# Patient Record
Sex: MALE | Race: WHITE
[De-identification: names, ages, dates, MRNs, and addresses within clinical notes are randomized per-mention and may not be internally consistent; named-entity substitution may affect disease eponyms.]

---

## 2022-09-28 ENCOUNTER — HOSPITAL ENCOUNTER (EMERGENCY)
Dept: HOSPITAL 97 - ER | Age: 22
Discharge: HOME | End: 2022-09-28
Payer: COMMERCIAL

## 2022-09-28 DIAGNOSIS — G40.909: ICD-10-CM

## 2022-09-28 DIAGNOSIS — S43.084A: Primary | ICD-10-CM

## 2022-09-28 PROCEDURE — 99284 EMERGENCY DEPT VISIT MOD MDM: CPT

## 2022-09-28 PROCEDURE — 0RSJXZZ REPOSITION RIGHT SHOULDER JOINT, EXTERNAL APPROACH: ICD-10-PCS
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
## 2022-09-28 NOTE — ER
Nurse's Notes                                                                                     

 [facility]                                                                 

Name: Junior Wang                                                                               

Age: 22 yrs                                                                                       

Sex: Male                                                                                         

: 2000                                                                                   

MRN: [id number]                                                                                   

Arrival Date: 2022                                                                          

Time: 13:38                                                                                       

Account#: [id number]                                                                            

Bed 11                                                                                            

Private MD:                                                                                       

Diagnosis: Other dislocation of right shoulder joint                                              

                                                                                                  

Presentation:                                                                                     

                                                                                             

13:42 Chief complaint: Patient states: This morning I was walking the down metal stairs this  iw  

      morning and work and I slipped and fell and I tried to brace myself with my arm and         

      when I did I felt a pop and my shoulder dislocated. Coronavirus screen: At this time,       

      the client does not indicate any symptoms associated with coronavirus-19. Ebola Screen:     

      No symptoms or risks identified at this time. Initial Sepsis Screen: Does the patient       

      meet any 2 criteria? No. Patient's initial sepsis screen is negative. Does the patient      

      have a suspected source of infection? No. Patient's initial sepsis screen is negative.      

      Risk Assessment: Do you want to hurt yourself or someone else? Patient reports no           

      desire to harm self or others. Onset of symptoms was 2022.                    

13:42 Method Of Arrival: Ambulatory                                                           iw  

13:42 Acuity: ARMANDO 3                                                                           iw  

                                                                                                  

Triage Assessment:                                                                                

13:44 General: Appears in no apparent distress. uncomfortable, Behavior is calm, cooperative. iw  

      Pain: Complains of pain in right arm. EENT: No deficits noted. No signs and/or symptoms     

      were reported regarding the EENT system. Neuro: No deficits noted. Cardiovascular: No       

      deficits noted. Respiratory: No deficits noted. GI: No deficits noted. No signs and/or      

      symptoms were reported involving the gastrointestinal system. : No deficits noted. No     

      signs and/or symptoms were reported regarding the genitourinary system. Derm: No            

      deficits noted. No signs and/or symptoms reported regarding the dermatologic system.        

      Musculoskeletal: Reports pain in right arm.                                                 

                                                                                                  

Historical:                                                                                       

- Allergies:                                                                                      

13:44 No Known Allergies;                                                                     iw  

- Home Meds:                                                                                      

13:44 Keppra 500 mg Oral tab 3 tabs daily [Active]; Ativan 1 mg Oral tab 1 tab once daily     iw  

      [Active];                                                                                   

- PMHx:                                                                                           

13:44 epilepsy;                                                                               iw  

- PSHx:                                                                                           

13:44 shoulder;                                                                               iw  

                                                                                                  

- Immunization history:: Adult Immunizations up to date, Client reports receiving the             

  2nd dose of the Covid vaccine, Client reports receiving the 1st dose of the Covid               

  vaccine.                                                                                        

- Social history:: Smoking status: Patient denies any tobacco usage or history of.                

                                                                                                  

                                                                                                  

Screenin:53 Abuse screen: Denies threats or abuse. Nutritional screening: No deficits noted.        bm7 

      Tuberculosis screening: No symptoms or risk factors identified. Fall Risk None              

      identified.                                                                                 

                                                                                                  

Assessment:                                                                                       

14:53 Reassessment: Patient and/or family updated on plan of care and expected duration. Pain bm7 

      level reassessed. Patient is alert, oriented x 3, equal unlabored respirations, skin        

      warm/dry/pink. Patient states feeling better. Patient states symptoms have improved.        

                                                                                                  

Vital Signs:                                                                                      

13:42  / 78; Pulse 60; Resp 16; Temp 97.9(O); Pulse Ox 100% on R/A; Weight 72.12 kg     iw  

      (R); Height 6 ft. 2 in. (187.96 cm); Pain 10/10;                                            

13:42 Body Mass Index 20.41 (72.12 kg, 187.96 cm)                                             iw  

                                                                                                  

ED Course:                                                                                        

13:38 Patient arrived in ED.                                                                  mr  

13:42 Rikki Morales PA is PHCP.                                                              [facility] 

13:42 Matt Moreno MD is Attending Physician.                                                [facility] 

13:44 Triage completed.                                                                       iw  

13:44 Arm band placed on left wrist.                                                          iw  

14:06 Luz Jean, RN is Primary Nurse.                                                   hb  

14:47 Richard Mancuso MD is Referral Physician.                                                [facility] 

14:53 Patient has correct armband on for positive identification. Call light in reach. Adult  bm7 

      w/ patient. Client placed on continuous cardiac and pulse oximetry monitoring. NIBP         

      monitoring applied.                                                                         

14:53 Scapular manipulation. Patient did not have IV access during this emergency room visit. bm7 

      Patient maintains SpO2 saturation greater than 95% on room air.                             

                                                                                                  

Administered Medications:                                                                         

No medications were administered                                                                  

                                                                                                  

                                                                                                  

Medication:                                                                                       

14:53 VIS not applicable for this client.                                                     bm7 

                                                                                                  

Outcome:                                                                                          

14:47 Discharge ordered by MD.                                                                [facility] 

14:53 Discharged to home ambulatory, with family.                                             bm7 

14:53 Condition: improved                                                                         

14:53 Discharge instructions given to patient, family, Instructed on discharge instructions,      

      follow up and referral plans. Demonstrated understanding of instructions, follow-up         

      care.                                                                                       

14:55 Patient left the ED.                                                                    bm7 

                                                                                                  

Signatures:                                                                                       

Rikki Morales PA PA jmm Rivera, Mary mr Williams, Irene, RN                     RN   iw                                                   

Luz Jean, RN                     RN   hb                                                   

Stephy, Zhanna, RN                  RN   bm7                                                  

                                                                                                  

**************************************************************************************************

## 2022-09-30 VITALS — OXYGEN SATURATION: 100 % | TEMPERATURE: 97.9 F | SYSTOLIC BLOOD PRESSURE: 112 MMHG | DIASTOLIC BLOOD PRESSURE: 78 MMHG

## 2023-03-02 ENCOUNTER — HOSPITAL ENCOUNTER (EMERGENCY)
Dept: HOSPITAL 97 - ER | Age: 23
Discharge: HOME | End: 2023-03-02
Payer: COMMERCIAL

## 2023-03-02 DIAGNOSIS — G40.901: Primary | ICD-10-CM

## 2023-03-02 LAB
ALBUMIN SERPL BCP-MCNC: 3.9 G/DL (ref 3.4–5)
ALP SERPL-CCNC: 58 U/L (ref 45–117)
ALT SERPL W P-5'-P-CCNC: 20 U/L (ref 16–61)
AST SERPL W P-5'-P-CCNC: 10 U/L (ref 15–37)
BUN BLD-MCNC: 14 MG/DL (ref 7–18)
GLUCOSE SERPLBLD-MCNC: 96 MG/DL (ref 74–106)
HCT VFR BLD CALC: 45.5 % (ref 39.6–49)
INR BLD: 1.08
LYMPHOCYTES # SPEC AUTO: 1.9 K/UL (ref 0.7–4.9)
MCV RBC: 88.5 FL (ref 80–100)
METHAMPHET UR QL SCN: NEGATIVE
PMV BLD: 7.2 FL (ref 7.6–11.3)
POTASSIUM SERPL-SCNC: 3.9 MMOL/L (ref 3.5–5.1)
RBC # BLD: 5.14 M/UL (ref 4.33–5.43)
THC SERPL-MCNC: NEGATIVE NG/ML

## 2023-03-02 PROCEDURE — 85610 PROTHROMBIN TIME: CPT

## 2023-03-02 PROCEDURE — 80307 DRUG TEST PRSMV CHEM ANLYZR: CPT

## 2023-03-02 PROCEDURE — 36415 COLL VENOUS BLD VENIPUNCTURE: CPT

## 2023-03-02 PROCEDURE — 93005 ELECTROCARDIOGRAM TRACING: CPT

## 2023-03-02 PROCEDURE — 96374 THER/PROPH/DIAG INJ IV PUSH: CPT

## 2023-03-02 PROCEDURE — 80048 BASIC METABOLIC PNL TOTAL CA: CPT

## 2023-03-02 PROCEDURE — 70450 CT HEAD/BRAIN W/O DYE: CPT

## 2023-03-02 PROCEDURE — 85025 COMPLETE CBC W/AUTO DIFF WBC: CPT

## 2023-03-02 PROCEDURE — 99284 EMERGENCY DEPT VISIT MOD MDM: CPT

## 2023-03-02 PROCEDURE — 85730 THROMBOPLASTIN TIME PARTIAL: CPT

## 2023-03-02 PROCEDURE — 81003 URINALYSIS AUTO W/O SCOPE: CPT

## 2023-03-02 PROCEDURE — 80076 HEPATIC FUNCTION PANEL: CPT

## 2023-03-02 NOTE — EDPHYS
Physician Documentation                                                                           

 [facility]                                                                 

Name: Junior Wang                                                                               

Age: 22 yrs                                                                                       

Sex: Male                                                                                         

: 2000                                                                                   

MRN: [id number]                                                                                   

Arrival Date: 2023                                                                          

Time: 10:41                                                                                       

Account#: [id number]                                                                            

Bed 13                                                                                            

Private MD:                                                                                       

ED Physician Matt Moreno                                                                         

HPI:                                                                                              

                                                                                             

11:17 This 22 yrs old Male presents to ER via EMS with complaints of Seizure.                 sb4 

11:17 The patient presents with a history of multiple seizures, a total of 2, the episode(s)  sb4 

      was witnessed, by a significant other, girlfriend. Character of seizure(s): Motor           

      activity: generalized, shaking all over, Incontinence: none, Apnea: the patient did not     

      experience apnea, Circulation: the patient did not experience evidence of pulse             

      disturbance. Seizure onset: just prior to arrival. Context: occurred at home, occurred      

      while the patient was asleep. Seizure Hx: Last seizure: The patient's last seizure was      

      approximately 3 day(s) ago, Seizure medications: Ativan, Keppra. Associated injury: The     

      patient did not suffer any apparent associated injury. EMS care: none. Current              

      symptoms: post-ictal. The patient has been recently seen by a physician: a neurologist.     

      EMS called by patient's girlfriend as she states she saw him seizing while asleep on        

      the couch, and a generalized shaking manner. He also reports that he had 1 last night.      

      Patient states he had 1 on Monday. He states that he has a history of absence seizures      

      and takes Keppra 500 mg 3 times a day, which she has been compliant with. Patient also      

      takes Ativan at night as needed, which he states that he has been out of for a week         

      now. Patient is sleepy but arousable and answering questions appropriately during my        

      assessment..                                                                                

                                                                                                  

Historical:                                                                                       

- Allergies:                                                                                      

10:46 No Known Allergies;                                                                     ld1 

- Home Meds:                                                                                      

10:46 Ativan 1 mg Oral tab 1 tab once daily [Active]; Keppra 500 mg Oral tab 3 tabs daily     ld1 

      [Active];                                                                                   

- PMHx:                                                                                           

10:46 epilepsy;                                                                               ld1 

- PSHx:                                                                                           

10:46 Shoulder;                                                                               ld1 

                                                                                                  

- Immunization history:: Adult Immunizations up to date, Client reports receiving the             

  2nd dose of the Covid vaccine.                                                                  

- Social history:: Smoking status: Patient reports the use of cigarette tobacco                   

  products, smokes one pack cigarettes per day. Patient/guardian denies using alcohol.            

                                                                                                  

                                                                                                  

ROS:                                                                                              

11:17 Constitutional: Negative for fever, chills, and weight loss, Eyes: Negative for injury, sb4 

      pain, redness, and discharge, ENT: Negative for injury, pain, and discharge,                

      Cardiovascular: Negative for chest pain, palpitations, and edema, Respiratory: Negative     

      for shortness of breath, cough, wheezing, and pleuritic chest pain, Abdomen/GI:             

      Negative for abdominal pain, nausea, vomiting, diarrhea, and constipation,                  

      MS/Extremity: Negative for injury and deformity, Skin: Negative for injury, rash, and       

      discoloration, Psych: Negative for depression, anxiety, suicide ideation, homicidal         

      ideation, and hallucinations.                                                               

11:17 Neuro: Positive for seizure activity.                                                       

                                                                                                  

Exam:                                                                                             

11:17 Head/Face:  Normocephalic, atraumatic. Eyes:  Extra-ocular motions intact.  Periorbital sb4 

      areas with no swelling, redness, or edema. ENT:  Mucous membranes moist.                    

      Cardiovascular:  Regular rate and rhythm with a normal S1 and S2. Respiratory:  Lungs       

      have equal breath sounds bilaterally, clear to auscultation and percussion.  No rales,      

      rhonchi or wheezes noted.  No increased work of breathing, no retractions or nasal          

      flaring. Abdomen/GI:  Soft, non-tender, no distension. Skin:  Warm, dry with normal         

      turgor.  Normal color with no rashes, no lesions, and no evidence of cellulitis. MS/        

      Extremity:  Pulses equal, no cyanosis.  Neurovascular intact.  Full, normal range of        

      motion. Neuro:  Awake and alert, GCS 15, oriented to person, place, time, and               

      situation.  Cranial nerves II-XII grossly intact.  Motor strength 5/5 in all                

      extremities.  Sensory grossly intact.  Cerebellar exam normal.  Normal gait. Psych:         

      Awake, alert, with orientation to person, place and time.  Behavior, mood, and affect       

      are within normal limits.                                                                   

11:17 Constitutional: The patient appears in no acute distress, post-ictal                        

11:17 ECG was reviewed by the Attending Physician.                                                

                                                                                                  

Vital Signs:                                                                                      

10:44  / 84; Pulse 53; Resp 18; Temp 97.6(O); Pulse Ox 99% on R/A; Weight 72.57 kg;     ld1 

      Height 5 ft. 10 in. (177.80 cm); Pain 6/10;                                                 

12:06  / 76; Pulse 61; Resp 18; Pulse Ox 99% on R/A;                                    ld1 

10:44 Body Mass Index 22.96 (72.57 kg, 177.80 cm)                                             ld1 

                                                                                                  

MDM:                                                                                              

10:42 Patient medically screened.                                                             sb4 

11:17 Differential diagnosis: drug overdose, cardiac arrhythmia, seizure, TIA, pseudoseizure, sb4 

      benzo withdrawal, alcohol intoxication.                                                     

13:16 Data reviewed: vital signs, nurses notes, EMS record, lab test result(s), EKG,          sb4 

      radiologic studies, CT scan, I have discussed the patient's presentation/case with the      

      attending Emergency Department Physician; and as a result, I will discharge patient.        

      Consideration of Admission/Observation Escalation of care including                         

      admission/observation considered. Care significantly affected by the following chronic      

      conditions: epilepsy. ED course: Patient with negative workup. Gave 1L fluid and 1000       

      mg keppra. Patient is back to baseline. Seizure likely secondary to patient being off       

      of ativan. Will dispo home with instructions to follow up with neurology ASAP. Return       

      precautions understood.  reviewed confirming prescription for ativan..                   

                                                                                                  

                                                                                             

10:42 Order name: Acetaminophen; Complete Time: 11:43                                         4 

                                                                                             

10:42 Order name: Basic Metabolic Panel; Complete Time: 11:43                                 sb4 

                                                                                             

10:42 Order name: CBC with Diff; Complete Time: 11:30                                         sb4 

                                                                                             

10:42 Order name: ETOH Level; Complete Time: 11:36                                            sb4 

                                                                                             

10:42 Order name: Hepatic Function; Complete Time: 11:43                                      4 

                                                                                             

10:42 Order name: PT-INR; Complete Time: 11:30                                                sb4 

                                                                                             

10:42 Order name: Ptt, Activated; Complete Time: 11:30                                        sb4 

                                                                                             

10:42 Order name: Salicylate; Complete Time: 11:48                                            sb4 

                                                                                             

10:42 Order name: Urine Drug Screen; Complete Time: 11:34                                     sb4 

                                                                                             

10:42 Order name: EKG; Complete Time: 10:43                                                   sb4 

                                                                                             

10:42 Order name: EKG - Nurse/Tech; Complete Time: 10:56                                      sb4 

                                                                                             

10:42 Order name: IV Saline Lock; Complete Time: 10:48                                        sb4 

                                                                                             

10:42 Order name: Labs collected and sent; Complete Time: 10:56                               sb4 

                                                                                             

10:42 Order name: Suicide Screening ([place]); Complete Time: 10:48                          sb4 

                                                                                             

10:42 Order name: Urine Dipstick-Ancillary (obtain specimen); Complete Time: 11:18            sb4 

                                                                                             

11:12 Order name: Urine Dipstick-Ancillary; Complete Time: 11:13                              EDMS

                                                                                             

11:37 Order name: Head Brain Wo Cont CT; Complete Time: 12:01                                 sb4 

                                                                                                  

EC:17 Rate is 50 beats/min. Rhythm is regular, Sinus bradycardia. Right axis deviation noted. sb4 

      QRS is positive in lead aVF and negative in lead I. PA interval is normal at 138 msec.      

      QRS interval is normal at 100 msec. QT interval is normal at 408 msec.                      

                                                                                                  

Administered Medications:                                                                         

10:56 Drug: NS 0.9% 1000 ml Route: IV; Rate: 1 bolus; Site: right antecubital;                ld1 

12:43 Drug: Keppra (levETIRAcetam) 1000 mg Route: IV; Rate: calculated rate; Site: right      ld1 

      antecubital;                                                                                

                                                                                                  

                                                                                                  

Disposition:                                                                                      

19:22 Co-signature as Attending Physician, Matt Moreno MD I reviewed the patient's care       rn  

      provided by the Advanced Practice Provider and agree with the diagnosis and treatment       

      plan.                                                                                       

                                                                                                  

Disposition Summary:                                                                              

23 13:18                                                                                    

Discharge Ordered                                                                                 

      Location: Home                                                                          sb4 

      Problem: an acute exacerbation                                                          sb4 

      Symptoms: have improved                                                                 sb4 

      Condition: Stable                                                                       sb4 

      Diagnosis                                                                                   

        - Epilepsy, unspecified, not intractable, with status epilepticus                     sb4 

      Followup:                                                                               sb4 

        - With:                                                                                    

        - When: Tomorrow                                                                           

        - Reason: Recheck today's complaints, Continuance of care, Re-evaluation by your           

      physician                                                                                   

      Discharge Instructions:                                                                     

        - Discharge Summary Sheet                                                             sb4 

        - Seizure, Adult                                                                      sb4 

        - Seizure, Adult, Easy-to-Read                                                        sb4 

      Forms:                                                                                      

        - Medication Reconciliation Form                                                      sb4 

        - Thank You Letter                                                                    sb4 

        - Antibiotic Education                                                                sb4 

        - Prescription Opioid Use                                                             sb4 

Signatures:                                                                                       

Dispatcher [employer]                           Matt Solis MD MD rn Dibbern, Lauren, RN                     RN   ld1                                                  

Caprice Falcon PA-C PA-C sb4                                                  

                                                                                                  

**************************************************************************************************

## 2023-03-02 NOTE — XMS REPORT
Continuity of Care Document

                            Created on:2023



Patient:TORRIE RUIZ

Sex:Male

:2000

External Reference #:[id number]





Demographics







                          Address                   [address] [place] [address]

 

                          Home Phone                [phone]

 

                          Work Phone                [phone]

 

                          Mobile Phone              [phone]

 

                          Email Address             [email or web address]

 

                          Preferred Language        English

 

                          Marital Status            Unknown

 

                          Mosque Affiliation     Unknown

 

                          Race                      Unknown

 

                          Ethnic Group              Unknown









Author







                          Organization              [facility]

t

 

                          Address                   [address]



                                                    [place] [address]

 

                          Phone                     [phone]









Care Team Providers







                    Name                Role                Phone

 

                    PCP, PATIENT DOES NOT HAVE A Primary Care Physician Unavaila

ble

 

                    Doctor Unassigned, No Name Attending Clinician Unavailable

 

                    VAN GALLARDO     Attending Clinician Unavailable

 

                    Van Gallardo MD  Attending Clinician [phone]

 

                    VAN GALLARDO     Admitting Clinician Unavailable









Payers







           Payer Name Policy Type Policy Number Effective Date Expiration Date S

ource

 

           [facility]            [id number] 2020 00:00:00            







Problems

This patient has no known problems.



Allergies, Adverse Reactions, Alerts







       Allergy Allergy Status Severity Reaction(s) Onset  Inactive Treating Comm

ents 

Source



       Name   Type                        Date   Date   Clinician        

 

       NO KNOWN Drug   Active                                           [facility]



       ALLERGIE Class                                                   ity of



       S                                                              [place]



                                                                      Medical



                                                                      Branch







Social History







           Social Habit Start Date Stop Date  Quantity   Comments   Source

 

           Exposure to 2022 Not sure              [facility]

 [place]



           SARS-CoV-2 (event) 00:00:00   02:29:00                         Medica

l Branch

 

           Sex Assigned At 2000                       [facility]



           Birth      00:00:00   00:00:00                         Medical Branch









                Smoking Status  Start Date      Stop Date       Source

 

                Tobacco smoking consumption                                 [facility]                                         Branch







Medications







       Ordered Filled Start  Stop   Current Ordering Indication Dosage Frequency

 Signature

                    Comments            Components          Source



     Medication Medication Date Date Medication? Clinician                (SIG) 

          



     Name Name                                                   

 

     levETIRAcet      2022 No             1000mg      1,000 mg,         

  [facility]



     am (KEPPRA)                                IV             ity of



     in NACL      08:45: 09:02                          Piggyback,           [place]

as



     (ISO-OS)      00   :00                           ONCE, 1           Medical



     1,000                                         dose, On           Branch



     mg/100 mL                                         Tue            



     RTU                                          22           



                                                  at 0245,           



                                                  Administer           



                                                  over 15           



                                                  Minutes,           



                                                  100 mL           

 

     levETIRAcet      2022- No        [id number] 1500mg      Take 3        

   [facility]



     am (KEPPRA)       12-30                          tablets by           i

ty of



     500 mg      00:00: 05:59                          mouth in           [place]



     tablet      00   :00                           the            Medical



                                                  morning           Branch



                                                  and 3           



                                                  tablets in           



                                                  the            



                                                  evening.           



                                                  Do all           



                                                  this for           



                                                  30 days.           







Vital Signs







             Vital Name   Observation Time Observation Value Comments     Source

 

             Systolic blood 2022 11:31:00 115 mm[Hg]                Univer

sity of



             pressure                                            [facility]

 

             Diastolic blood 2022 11:31:00 77 mm[Hg]                 [facility]

 

             Heart rate   2022 11:31:00 53 /min                   [facility]

 

             Respiratory rate 2022 11:31:00 15 /min                   [facility]

 

             Oxygen saturation in 2022 11:31:00 98 /min                   

[facility]



             Arterial blood by                                        [place] [facility]i

neisha



             Pulse oximetry                                        Branch

 

             Body temperature 2022 08:29:00 36.11 Lynne                 [facility]

 

             Body height  2022 08:29:00 188 cm                    [facility]

 

             Body weight  2022 08:29:00 67.132 kg                 [facility]

 

             BMI          2022 08:29:00 19.00 kg/m2               [facility]







Procedures







                Procedure       Date / Time     Performing Clinician Source



                                Performed                       

 

                AUTHORIZATION FOR 2022 06:01:00 Doctor Unassigned, No [facility]



                RELEASE OF PHI                  [facility]

 

                XR SHOULDER 2+ VW LEFT 2022 10:50:55 Van Gallardo [facility]

 

                URINALYSIS      2022 09:37:00 Van Gallardo [facility]

 

                URINE DRUG (IMMUNOASSAY) 2022 09:37:00 Van Gallardo [facility]



                SCREEN W/O REFLEX                                 

 

                XR SHOULDER 2+ VW RIGHT 2022 08:54:23 Van Gallardo [facility]

 

                MAGNESIUM       2022 08:34:00 Van Gallardo o

f [facility]

 

                COMP. METABOLIC PANEL 2022 08:34:00 Van Gallardo [facility]



                ([id number])                                         Medical Branch

 

                ETHANOL         2022 08:34:00 Van Gallardo [facility]

 

                CBC WITH DIFF   2022 08:34:00 Van Gallardo [facility]

 

                NOTICE OF PRIVACY 2022 17:38:09 Doctor Unassigned, No Univ

[facility]



                PRACTICES                       Name            Medical Branch

 

                CONSENT/REFUSAL FOR 2022 17:37:51 Doctor Unassigned, No [facility]



                DIAGNOSIS AND TREATMENT                 Name            Medical 

[place]







Encounters







        Start   End     Encounter Admission Attending Care    Care    Encounter 

Source



        Date/Time Date/Time Type    Type    Clinicians Facility Department ID   

   

 

        2022 Orders          Doctor BELTRAN    [id number]

63 Univers



        00:00:00 00:00:00 Only            Unassigned, APOORVA   350.1.13.10       

  ity of



                                        [facility] 4.2.7.2.686         [place]ip

as



                                                        [phone]         Medi

neisha



                                                        009             Branch

 

        2022 [facility]     [id number]

65 Univers



        02:28:00 05:45:00                 VAN huang [facility]

 

        2022 Emergency         [facility]    [id number]

0050 Univers



        02:28:00 05:45:00                 Van DIALLO 350.1.13.10         i

ty of



                                                [place] 4.2.7.2.686         [facility]  [phone]         Medi

neisha



                                                        084             Branch

 

        2022 Emergency X       [facility]     [id number]

44 Univers



        13:17:00 13:18:00                 VAN huang [facility]

 

        2022 [facility]         [facility]    [id number]

7683 Univers



        13:17:00 13:18:00                 Van SANTOXOCHILT 350.1.13.10         i

ty of



                                                [place] 4.2.7.2.686         [facility]  [phone]         Medi

neisha



                                                        [address]             Branch







Results







           Test Description Test Time  Test Comments Results    Result     [facility]

e



                                                       Comments   

 

           ETHANOL    2022            ALCOHOL<10mg/dL1            Universi

ty of



                      09:29:39              2022 3:29            [facility]            



                                            LABORATORY<10            



                                            Roeeehvn21-228            



                                            Toxic>100             



                                            Depression of            



                                            CNS>400               



                                            Fatalities            



                                            Reported              









                    MAGNESIUM           2022 09:03:46 









                      Test Item  Value      Reference Range Interpretation Comme

nts









             MAGNESIUM (test code = 1534391896) 2.1 mg/dL    1.7-2.4            

       

 

             Lab Interpretation (test code = 33234-5) Normal                    

             



[id number] 
[facility]:25





             Test Item    Value        Reference Range Interpretation Comments

 

             NA (test code = 141 mmol/L   135-145                   



             5589276858)                                         

 

             K (test code = 4.2 mmol/L   3.5-5.0                   



             4416180344)                                         

 

             CL (test code = 107 mmol/L                       



             0206600003)                                         

 

             CO2 TOTAL (test code = 23 mmol/L    23-31                     



             7921639131)                                         

 

             AGAP (test code =              2-16                      



             0973681956)                                         

 

             BUN (test code = 11 mg/dL     7-23                      



             5543812384)                                         

 

             GLUCOSE (test code = 143 mg/dL           H            



             9189049584)                                         

 

             CREATININE (test code = 1.06 mg/dL   0.60-1.25                 



             2747091782)                                         

 

             TOTAL BILI (test code = 0.8 mg/dL    0.1-1.1                   



             2802153133)                                         

 

             CALCIUM (test code = 8.8 mg/dL    8.6-10.6                  



             5433219872)                                         

 

             T PROTEIN (test code = 6.6 g/dL     6.3-8.2                   



             6729086941)                                         

 

             ALBUMIN (test code = 4.3 g/dL     3.5-5.0                   



             0506097082)                                         

 

             ALK PHOS (test code = 56 U/L                           



             0431433358)                                         

 

             ALTv (test code = 17 U/L       5-50                      



             1742-6)                                             

 

             AST(SGOT) (test code = 19 U/L       13-40                     



             8600868800)                                         

 

             eGFR (test code =              mL/min/1.73m2              



             2463407709)                                         

 

             MICHAEL (test code = MICHAEL) Association of                           



                          Glomerular Filtration                           



                          Rate (GFR) and Staging                           



                          of Kidney Disease*                           



                          +---------------------                           



                          --+-------------------                           



                          --+-------------------                           



                          ------+| GFR                           



                          (mL/min/1.73 m2) ?|                           



                          With Kidney Damage ?|                           



                          ?Without Kidney                           



                          Damage+---------------                           



                          --------+-------------                           



                          --------+-------------                           



                          ------------+| ?>90 ?                           



                          ? ? ? ? ? ? ? ?|                           



                          ?Stage one ? ? ? ? ?|                           



                          ? Normal ? ? ? ? ? ? ?                           



                          ?+--------------------                           



                          ---+------------------                           



                          ---+------------------                           



                          -------+| ?60-89 ? ? ?                           



                          ? ? ? ? ?| ?Stage two                           



                          ? ? ? ? ?| ? Decreased                           



                          GFR ? ? ? ?                            



                          +---------------------                           



                          --+-------------------                           



                          --+-------------------                           



                          ------+| ?30-59 ? ? ?                           



                          ? ? ? ? ?| ?Stage                           



                          three ? ? ? ?| ? Stage                           



                          three ? ? ? ? ?                           



                          +---------------------                           



                          --+-------------------                           



                          --+-------------------                           



                          ------+| ?15-29 ? ? ?                           



                          ? ? ? ? ?| ?Stage four                           



                          ? ? ? ? | ? Stage four                           



                          ? ? ? ? ?                              



                          ?+--------------------                           



                          ---+------------------                           



                          ---+------------------                           



                          -------+| ?<15 (or                           



                          dialysis) ? ?| ?Stage                           



                          five ? ? ? ? | ? Stage                           



                          five ? ? ? ? ?                           



                          ?+--------------------                           



                          ---+------------------                           



                          ---+------------------                           



                          -------+ *Each stage                           



                          assumes the associated                           



                          GFR level has been in                           



                          effect for at least                           



                          three months. ?Stages                           



                          1 to 5, with or                           



                          without kidney                           



                          disease, indicate                           



                          chronic kidney                           



                          disease. Notes:                           



                          Determination of                           



                          stages one and two                           



                          (with eGFR                             



                          >59mL/min/1.73 m2)                           



                          requires estimation of                           



                          kidney damage for at                           



                          least three months as                           



                          defined by structural                           



                          or functional                           



                          abnormalities of the                           



                          kidney, manifested by                           



                          either:Pathological                           



                          abnormalities or                           



                          Markers of kidney                           



                          damage (including                           



                          abnormalities in the                           



                          composition of the                           



                          blood or urine or                           



                          abnormalities in                           



                          imaging tests).                           

 

             Lab Interpretation Abnormal                               



             (test code = 18128-2)                                        



[facility] WITH [id number] 08:44:42





             Test Item    Value        Reference Range Interpretation Comments

 

             WBC (test code =              See_Comment                [Automated



             4447-2)                                             message] The sy

stem



                                                                 which generated



                                                                 this result



                                                                 transmitted



                                                                 reference range

:



                                                                 4.20 - 10.70



                                                                 10*3/?L. The



                                                                 reference range

 was



                                                                 not used to



                                                                 interpret this



                                                                 result as



                                                                 normal/abnormal

.

 

             RBC (test code =              See_Comment                [Automated



             868-5)                                              message] The sy

stem



                                                                 which generated



                                                                 this result



                                                                 transmitted



                                                                 reference range

:



                                                                 4.26 - 5.52



                                                                 10*6/?L. The



                                                                 reference range

 was



                                                                 not used to



                                                                 interpret this



                                                                 result as



                                                                 normal/abnormal

.

 

             HGB (test code = 14.2 g/dL    12.2-16.4                 



             718-7)                                              

 

             HCT (test code = 40.7 %       38.4-49.3                 



             4544-3)                                             

 

             MCV (test code = 88.5 fL      81.7-95.6                 



             787-2)                                              

 

             MCH (test code = 30.9 pg      26.1-32.7                 



             785-6)                                              

 

             MCHC (test code = 34.9 g/dL    31.2-35.0                 



             786-4)                                              

 

             RDW-SD (test code = 39.8 fL      38.5-51.6                 



             68655-8)                                            

 

             RDW-CV (test code = 12.4 %       12.1-15.4                 



             788-0)                                              

 

             PLT (test code =              See_Comment                [Automated



             777-3)                                              message] The sy

stem



                                                                 which generated



                                                                 this result



                                                                 transmitted



                                                                 reference range

:



                                                                 150 - 328 10*3/

?L.



                                                                 The reference r

ron



                                                                 was not used to



                                                                 interpret this



                                                                 result as



                                                                 normal/abnormal

.

 

             MPV (test code = 9.1 fL       9.8-13.0     L            



             78987-7)                                            

 

             NRBC/100 WBC (test              See_Comment                [Automat

ed



             code = 6884991530)                                        message] 

The system



                                                                 which generated



                                                                 this result



                                                                 transmitted



                                                                 reference range

:



                                                                 0.0 - 10.0 /100



                                                                 WBCs. The refer

ence



                                                                 range was not u

sed



                                                                 to interpret th

is



                                                                 result as



                                                                 normal/abnormal

.

 

             NRBC x10^3 (test code              See_Comment                [Auto

mated



             = [id number])                                        message] The s

ystem



                                                                 which generated



                                                                 this result



                                                                 transmitted



                                                                 reference range

:



                                                                 10*3/?L. The



                                                                 reference range

 was



                                                                 not used to



                                                                 interpret this



                                                                 result as



                                                                 normal/abnormal

.

 

             GRAN MAT (NEUT) % 54.1 %                                 



             (test code = 770-8)                                        

 

             IMM GRAN % (test code 0.50 %                                 



             = [id number])                                        

 

             LYMPH % (test code = 35.0 %                                 



             736-9)                                              

 

             MONO % (test code = 8.9 %                                  



             5905-5)                                             

 

             EOS % (test code = 0.7 %                                  



             713-8)                                              

 

             BASO % (test code = 0.8 %                                  



             706-2)                                              

 

             GRAN MAT x10^3(ANC) 3.23 10*3/uL 1.99-6.95                 



             (test code =                                        



             2950185272)                                         

 

             IMM GRAN x10^3 (test 0.03 10*3/uL 0.00-0.06                 



             code = [id number])                                        

 

             LYMPH x10^3 (test code 2.09 10*3/uL 1.09-3.23                 



             = 731-0)                                            

 

             MONO x10^3 (test code 0.53 10*3/uL 0.36-1.02                 



             = 742-7)                                            

 

             EOS x10^3 (test code = 0.04 10*3/uL 0.06-0.53    L            



             711-2)                                              

 

             BASO x10^3 (test code 0.05 10*3/uL 0.01-0.09                 



             = 704-7)                                            

 

             Lab Interpretation Abnormal                               



             (test code = 35327-6)                                        



[facility]

## 2023-03-02 NOTE — ER
Nurse's Notes                                                                                     

 [facility]                                                                 

Name: Junior Wang                                                                               

Age: 22 yrs                                                                                       

Sex: Male                                                                                         

: 2000                                                                                   

MRN: [id number]                                                                                   

Arrival Date: 2023                                                                          

Time: 10:41                                                                                       

Account#: [id number]                                                                            

Bed 13                                                                                            

Private MD:                                                                                       

Diagnosis: Epilepsy, unspecified, not intractable, with status epilepticus                        

                                                                                                  

Presentation:                                                                                     

                                                                                             

10:44 Chief complaint: Patient states: EMS toned out to patient home for seizure. EMS arrived ld1 

      - pt was postictal. Pt c/o pain to entire body post seizure. Denies hitting head -          

      "body aching.". Coronavirus screen: At this time, the client does not indicate any          

      symptoms associated with coronavirus-19. Ebola Screen: No symptoms or risks identified      

      at this time. Initial Sepsis Screen: Does the patient meet any 2 criteria? No.              

      Patient's initial sepsis screen is negative. Does the patient have a suspected source       

      of infection? No. Patient's initial sepsis screen is negative. Risk Assessment: Do you      

      want to hurt yourself or someone else? Patient reports no desire to harm self or            

      others. Onset of symptoms was 2023.                                               

10:44 Method Of Arrival: EMS: [facility]                                                    ld1 

10:44 Acuity: ARMANDO 3                                                                           ld1 

                                                                                                  

Triage Assessment:                                                                                

10:46 General: Appears in no apparent distress. comfortable, Behavior is calm, cooperative,   ld1 

      appropriate for age. Pain: Complains of pain in all over body aches. EENT: No signs         

      and/or symptoms were reported regarding the EENT system. Neuro: Level of Consciousness      

      is awake, alert, obeys commands, Oriented to person, place, time, situation,                

      Appropriate for age. Cardiovascular: Capillary refill < 3 seconds Patient's skin is         

      warm and dry. Respiratory: Airway is patent Respiratory effort is even, unlabored. GI:      

      Abdomen is flat, non-distended. : No signs and/or symptoms were reported regarding        

      the genitourinary system. Derm: No signs and/or symptoms reported regarding the             

      dermatologic system. Musculoskeletal: No signs and/or symptoms reported regarding the       

      musculoskeletal system.                                                                     

                                                                                                  

Historical:                                                                                       

- Allergies:                                                                                      

10:46 No Known Allergies;                                                                     ld1 

- Home Meds:                                                                                      

10:46 Ativan 1 mg Oral tab 1 tab once daily [Active]; Keppra 500 mg Oral tab 3 tabs daily     ld1 

      [Active];                                                                                   

- PMHx:                                                                                           

10:46 epilepsy;                                                                               ld1 

- PSHx:                                                                                           

10:46 Shoulder;                                                                               ld1 

                                                                                                  

- Immunization history:: Adult Immunizations up to date, Client reports receiving the             

  2nd dose of the Covid vaccine.                                                                  

- Social history:: Smoking status: Patient reports the use of cigarette tobacco                   

  products, smokes one pack cigarettes per day. Patient/guardian denies using alcohol.            

                                                                                                  

                                                                                                  

Screening:                                                                                        

10:47 [facility] ED Fall Risk Assessment (Adult) History of falling in the last 3 months,       ld1 

      including since admission No falls in past 3 months (0 pts). Abuse screen: Denies           

      threats or abuse. Denies injuries from another. Nutritional screening: No deficits          

      noted. Tuberculosis screening: No symptoms or risk factors identified.                      

                                                                                                  

Assessment:                                                                                       

10:47 Reassessment: See triage assessment.                                                    ld1 

13:09 Reassessment: Patient appears in no apparent distress at this time. Patient and/or      ld1 

      family updated on plan of care and expected duration. Pain level reassessed. Pt reports     

      "feeling better." Notified ERP at this time. Pt requesting to leavve.                       

                                                                                                  

Vital Signs:                                                                                      

10:44  / 84; Pulse 53; Resp 18; Temp 97.6(O); Pulse Ox 99% on R/A; Weight 72.57 kg;     ld1 

      Height 5 ft. 10 in. (177.80 cm); Pain 6/10;                                                 

12:06  / 76; Pulse 61; Resp 18; Pulse Ox 99% on R/A;                                    ld1 

10:44 Body Mass Index 22.96 (72.57 kg, 177.80 cm)                                             ld1 

                                                                                                  

ED Course:                                                                                        

10:41 Patient arrived in ED.                                                                  sb4 

10:42 Caprice Falcon PA-C is PHCP.                                                            sb4 

10:42 Matt Moreno MD is Attending Physician.                                                sb4 

10:44 Jannet Jones RN is Primary Nurse.                                                   ld1 

10:46 Triage completed.                                                                       ld1 

10:46 Arm band placed on right wrist.                                                         ld1 

10:47 Patient has correct armband on for positive identification. Placed in gown. Bed in low  ld1 

      position. Call light in reach. Side rails up X2. Seizure precautions initiated. Cardiac     

      monitor on. Pulse ox on. NIBP on. Door closed. Noise minimized. Warm blanket given.         

10:47 No provider procedures requiring assistance completed. Inserted saline lock: 20 gauge   ld1 

      in right antecubital area, using aseptic technique. Blood collected.                        

11:18 Urine Drug Screen Sent.                                                                 ld1 

11:53 Head Brain Wo Cont CT In Process Unspecified.                                           EDMS

13:18 Liang Lucio MD is Referral Physician.                                                 sb4 

13:23 IV discontinued, intact, bleeding controlled, No redness/swelling at site.              ld1 

                                                                                                  

Administered Medications:                                                                         

10:56 Drug: NS 0.9% 1000 ml Route: IV; Rate: 1 bolus; Site: right antecubital;                ld1 

12:43 Drug: Keppra (levETIRAcetam) 1000 mg Route: IV; Rate: calculated rate; Site: right      ld1 

      antecubital;                                                                                

                                                                                                  

                                                                                                  

Medication:                                                                                       

10:47 VIS not applicable for this client.                                                     ld1 

                                                                                                  

Outcome:                                                                                          

13:18 Discharge ordered by MD.                                                                sb4 

13:23 Discharged to home ambulatory.                                                          ld1 

13:23 Condition: stable                                                                           

13:23 Discharge instructions given to patient, family, Instructed on discharge instructions,      

      follow up and referral plans. Demonstrated understanding of instructions, follow-up         

      care.                                                                                       

13:23 Patient left the ED.                                                                    ld1 

                                                                                                  

Signatures:                                                                                       

Dispatcher [employer]                           EDMS                                                 

Jannet Jones, RN                     RN   ld1                                                  

Caprice Falcon, PA-C                     PA-C sb4                                                  

                                                                                                  

**************************************************************************************************

## 2023-03-02 NOTE — RAD REPORT
EXAM DESCRIPTION:  CT - Head Brain Wo Cont - 3/2/2023 11:51 am

 

CLINICAL HISTORY:  SEIZURE

 

COMPARISON:  No comparisons

 

TECHNIQUE:  All CT scans are performed using dose optimization technique as appropriate and may inclu
de automated exposure control or mA/KV adjustment according to patient size.

 

FINDINGS:  No intracranial hemorrhage, hydrocephalus or extra-axial fluid collection.No areas of brai
n edema or evidence of midline shift.

 

The paranasal sinuses and mastoids are clear. The calvarium is intact.

 

IMPRESSION:  No acute intracranial abnormality.

## 2023-03-03 NOTE — EKG
Test Date:    2023-03-02               Test Time:    10:59:00

Technician:   MARISELA                                   

                                                     

MEASUREMENT RESULTS:                                       

Intervals:                                           

Rate:         50                                     

IN:           138                                    

QRSD:         100                                    

QT:           408                                    

QTc:          371                                    

Axis:                                                

P:            74                                     

IN:           138                                    

QRS:          94                                     

T:            79                                     

                                                     

INTERPRETIVE STATEMENTS:                                       

                                                     

Sinus bradycardia

Rightward axis

Borderline ECG

No previous ECG available for comparison



Electronically Signed On 03-03-23 14:29:31 CST by Phu Franco